# Patient Record
(demographics unavailable — no encounter records)

---

## 2020-01-01 NOTE — NUR
D/C HOME, BANDS MATCHED WITH PARENTS, PPFU APPOINTMENT MADE AND D/C
INSTRUCTIONS REVIEWED AND QUESTIONS ANSWERED.